# Patient Record
Sex: FEMALE | Race: BLACK OR AFRICAN AMERICAN | NOT HISPANIC OR LATINO | Employment: FULL TIME | URBAN - METROPOLITAN AREA
[De-identification: names, ages, dates, MRNs, and addresses within clinical notes are randomized per-mention and may not be internally consistent; named-entity substitution may affect disease eponyms.]

---

## 2017-03-03 ENCOUNTER — SEE NOTE (OUTPATIENT)
Dept: URBAN - METROPOLITAN AREA CLINIC 36 | Facility: CLINIC | Age: 52
Setting detail: DERMATOLOGY
End: 2017-03-03

## 2017-03-03 PROCEDURE — 11100 BX SKIN SUBCUTANEOUS&/MUCOUS MEMBRANE 1 LESION: CPT

## 2019-07-04 ENCOUNTER — HOSPITAL ENCOUNTER (EMERGENCY)
Dept: HOSPITAL 5 - ED | Age: 54
Discharge: HOME | End: 2019-07-04
Payer: COMMERCIAL

## 2019-07-04 VITALS — DIASTOLIC BLOOD PRESSURE: 67 MMHG | SYSTOLIC BLOOD PRESSURE: 134 MMHG

## 2019-07-04 DIAGNOSIS — K02.9: ICD-10-CM

## 2019-07-04 DIAGNOSIS — K05.00: Primary | ICD-10-CM

## 2019-07-04 PROCEDURE — 96372 THER/PROPH/DIAG INJ SC/IM: CPT

## 2019-07-04 PROCEDURE — 99282 EMERGENCY DEPT VISIT SF MDM: CPT

## 2019-07-04 NOTE — EMERGENCY DEPARTMENT REPORT
ED General Adult HPI





- General


Chief complaint: Dental/Oral


Stated complaint: TOOTHACHE


Time Seen by Provider: 07/04/19 21:26


Source: patient


Mode of arrival: Ambulatory


Limitations: No Limitations





- History of Present Illness


Initial comments: 





Patient is a 53-year-old -American female who presents to the ED with 

complaint of acute onset of persistent painful swelling right maxillary premolar

counts and premolar and molar toothaches for the last 3 days after having a 

dental procedure 4 days ago.  Patient states that she's been taking over-the-

counter medications for pain with no relief.  Patient denies rheumatic injury, 

sore throat, nausea, vomiting, nasal and sinus congestion, ear pain, dizziness, 

headache, fever or chills, chest pain or shortness of breath.


MD Complaint: Swollen gums; Dental pain


-: Sudden, days(s) (3)


Location: mouth


Radiation: non-radiation


Severity scale (0 -10): 8


Quality: aching, sharp


Consistency: constant


Improves with: none


Worsens with: none


Associated Symptoms: denies other symptoms, loss of appetite.  denies: 

confusion, chest pain, cough, diaphoresis, fever/chills, headaches, malaise, 

nausea/vomiting, rash, seizure, shortness of breath, syncope, weakness


Treatments Prior to Arrival: NSAID





- Related Data


                                  Previous Rx's











 Medication  Instructions  Recorded  Last Taken  Type


 


Amoxicillin/Potassium Clav 1 each PO Q12H #20 tablet 07/04/19 Unknown Rx





[Augmentin 875-125 Tablet]    


 


Ketorolac [Toradol] 10 mg PO Q8H PRN #20 tablet 07/04/19 Unknown Rx


 


traMADol [Ultram] 50 mg PO Q6HR PRN #15 tablet 07/04/19 Unknown Rx











                                    Allergies











Allergy/AdvReac Type Severity Reaction Status Date / Time


 


No Known Allergies Allergy   Unverified 07/04/19 21:17














ED Review of Systems


ROS: 


Stated complaint: TOOTHACHE


Other details as noted in HPI





Constitutional: denies: chills, fever


Eyes: denies: eye pain, eye discharge, vision change


ENT: dental pain, other (swollen painful gums).  denies: ear pain, throat pain


Respiratory: denies: cough, shortness of breath, wheezing


Cardiovascular: denies: chest pain, palpitations


Endocrine: no symptoms reported


Gastrointestinal: denies: abdominal pain, nausea, diarrhea


Genitourinary: denies: urgency, dysuria, discharge


Musculoskeletal: denies: back pain, joint swelling, arthralgia


Skin: denies: rash, lesions


Neurological: denies: headache, weakness, paresthesias


Psychiatric: denies: anxiety, depression


Hematological/Lymphatic: denies: easy bleeding, easy bruising





ED Past Medical Hx





- Past Medical History


Previous Medical History?: No





- Surgical History


Past Surgical History?: No





- Social History


Smoking Status: Never Smoker


Substance Use Type: None





- Medications


Home Medications: 


                                Home Medications











 Medication  Instructions  Recorded  Confirmed  Last Taken  Type


 


Amoxicillin/Potassium Clav 1 each PO Q12H #20 tablet 07/04/19  Unknown Rx





[Augmentin 875-125 Tablet]     


 


Ketorolac [Toradol] 10 mg PO Q8H PRN #20 tablet 07/04/19  Unknown Rx


 


traMADol [Ultram] 50 mg PO Q6HR PRN #15 tablet 07/04/19  Unknown Rx














ED Physical Exam





- General


Limitations: No Limitations


General appearance: alert, in no apparent distress





- Head


Head exam: Present: atraumatic, normocephalic, normal inspection





- Eye


Eye exam: Present: normal appearance, PERRL, EOMI.  Absent: scleral icterus, 

conjunctival injection, nystagmus, periorbital swelling, periorbital tenderness


Pupils: Present: normal accommodation





- ENT


ENT exam: Present: normal exam, mucous membranes moist, TM's normal bilaterally,

 normal external ear exam, other (swollen right maxillary premolar gums, with 

premolar and molar teeth tenderness)





- Neck


Neck exam: Present: normal inspection, full ROM





- Respiratory


Respiratory exam: Present: normal lung sounds bilaterally.  Absent: respiratory 

distress, wheezes, rales, rhonchi, chest wall tenderness, accessory muscle use, 

prolonged expiratory





- Cardiovascular


Cardiovascular Exam: Present: regular rate, normal rhythm, normal heart sounds. 

 Absent: systolic murmur, diastolic murmur, rubs, gallop





- GI/Abdominal


GI/Abdominal exam: Present: soft, normal bowel sounds.  Absent: tenderness, 

guarding, rebound, hyperactive bowel sounds, hypoactive bowel sounds, 

organomegaly, bruit, pulsatile mass





- Rectal


Rectal exam: Present: deferred





- Extremities Exam


Extremities exam: Present: normal inspection, full ROM, normal capillary refill





- Back Exam


Back exam: Present: normal inspection, full ROM.  Absent: tenderness, CVA 

tenderness (R), CVA tenderness (L), muscle spasm, paraspinal tenderness, 

vertebral tenderness





- Neurological Exam


Neurological exam: Present: alert, oriented X3, CN II-XII intact, normal gait, 

reflexes normal





- Psychiatric


Psychiatric exam: Present: normal affect, normal mood





- Skin


Skin exam: Present: warm, dry, intact, normal color.  Absent: rash





ED Course





                                   Vital Signs











  07/04/19 07/04/19 07/04/19





  21:25 21:51 22:19


 


Temperature 98 F  


 


Pulse Rate 91 H  


 


Respiratory 16 16 16





Rate   


 


Blood Pressure 134/67  


 


O2 Sat by Pulse 97  





Oximetry   














- Reevaluation(s)


Reevaluation #1: 





07/04/19 22:38


Patient is alert and oriented 3 and is not in distress with normal vital signs.

  Patient was treated for pain in the ED and discharged home on pain medications

 and prophylactic antibiotics, and advised to follow-up with her primary care 

physician and her dentist in 5-7 days for reevaluation.  Patient was advised to 

return to the ED immediately if symptoms get worse.





ED Medical Decision Making





- Medical Decision Making





Patient is alert and oriented 3 and is not in distress with normal vital signs.

  Patient was treated for pain in the ED and discharged home on pain medications

 and prophylactic antibiotics, and advised to follow-up with her primary care 

physician and her dentist in 5-7 days for reevaluation.  Patient was advised to 

return to the ED immediately if symptoms get worse.





- Differential Diagnosis


dental abscess; gingivitis, dental caries


Critical care attestation.: 


If time is entered above; I have spent that time in minutes in the direct care 

of this critically ill patient, excluding procedure time.








ED Disposition


Clinical Impression: 


 Acute gingivitis, Dental caries





Disposition: DC-01 TO HOME OR SELFCARE


Is pt being admited?: No


Does the pt Need Aspirin: No


Condition: Stable


Instructions:  Dental Caries (ED), Gingivitis (ED)


Additional Instructions: 


Take medications with food, drink plenty of fluids and follow-up with your 

dentist in 5-7 days for reevaluation.  Return to the ED immediately if symptoms 

get worse.


Prescriptions: 


Amoxicillin/Potassium Clav [Augmentin 875-125 Tablet] 1 each PO Q12H #20 tablet


Ketorolac [Toradol] 10 mg PO Q8H PRN #20 tablet


 PRN Reason: Pain


traMADol [Ultram] 50 mg PO Q6HR PRN #15 tablet


 PRN Reason: Pain


Referrals: 


[facility] [Other] - 3-5 Days


Time of Disposition: 22:30


Print Language: ENGLISH

## 2019-07-04 NOTE — EVENT NOTE
ED Screening Note


ED Screening Note: 


CO UPPER AND LOWER DENTAL PAIN AFTER A DEEP CLEAN 2 DAYS AGO


SHARP SHOOTING PAIN





NO ABSCESS





This initial assessment/diagnostic orders/clinical plan/treatment(s) is/are 

subject to change based on patients health status, clinical progression and re-

assessment by fellow clinical providers in the ED. Further treatment and workup 

at subsequent clinical providers discretion. Patient/guardian urged not to elope

from the ED as their condition may be serious if not clinically assessed and 

managed. 





Initial orders include: